# Patient Record
Sex: FEMALE | Race: WHITE | URBAN - METROPOLITAN AREA
[De-identification: names, ages, dates, MRNs, and addresses within clinical notes are randomized per-mention and may not be internally consistent; named-entity substitution may affect disease eponyms.]

---

## 2024-11-23 ENCOUNTER — OFFICE VISIT (OUTPATIENT)
Dept: URGENT CARE | Age: 49
End: 2024-11-23
Payer: COMMERCIAL

## 2024-11-23 VITALS
OXYGEN SATURATION: 98 % | DIASTOLIC BLOOD PRESSURE: 62 MMHG | SYSTOLIC BLOOD PRESSURE: 128 MMHG | RESPIRATION RATE: 16 BRPM | HEART RATE: 73 BPM | TEMPERATURE: 99.2 F

## 2024-11-23 DIAGNOSIS — R05.1 ACUTE COUGH: Primary | ICD-10-CM

## 2024-11-23 RX ORDER — ESCITALOPRAM OXALATE 10 MG/1
10 TABLET ORAL DAILY
COMMUNITY

## 2024-11-23 RX ORDER — LEVOTHYROXINE SODIUM 25 UG/1
TABLET ORAL
COMMUNITY

## 2024-11-23 RX ORDER — BENZONATATE 200 MG/1
200 CAPSULE ORAL 3 TIMES DAILY PRN
Qty: 21 CAPSULE | Refills: 0 | Status: SHIPPED | OUTPATIENT
Start: 2024-11-23 | End: 2024-11-30

## 2024-11-23 RX ORDER — ALBUTEROL SULFATE 90 UG/1
2 INHALANT RESPIRATORY (INHALATION) EVERY 6 HOURS PRN
Qty: 1 G | Refills: 0 | Status: SHIPPED | OUTPATIENT
Start: 2024-11-23

## 2024-11-23 ASSESSMENT — ENCOUNTER SYMPTOMS
FEVER: 0
COUGH: 1
CHILLS: 0
SHORTNESS OF BREATH: 0
SORE THROAT: 0
SINUS PRESSURE: 0
CHEST TIGHTNESS: 0
SINUS PAIN: 0
EYE PAIN: 0
WHEEZING: 0
EYE REDNESS: 0
EYE DISCHARGE: 0

## 2024-11-23 NOTE — PROGRESS NOTES
Subjective   Patient ID: Amina Cuevas is a 48 y.o. female. They present today with a chief complaint of Cough (Cough x 6 weeks with negative covid test.).    History of Present Illness    History provided by:  Patient   used: No    Cough  This is a new problem. The current episode started 1 to 4 weeks ago. The problem has been gradually worsening. The problem occurs every few minutes. The cough is Productive of sputum. Pertinent negatives include no chest pain, chills, ear pain, eye redness, fever, sore throat, shortness of breath or wheezing. She has tried nothing for the symptoms.       Past Medical History  Allergies as of 11/23/2024 - Reviewed 11/23/2024   Allergen Reaction Noted    Sulfa (sulfonamide antibiotics) Rash 09/25/2015       (Not in a hospital admission)       History reviewed. No pertinent past medical history.    History reviewed. No pertinent surgical history.         Review of Systems  Review of Systems   Constitutional:  Negative for chills and fever.   HENT:  Negative for ear pain, sinus pressure, sinus pain and sore throat.         Ear blockage   Eyes:  Negative for pain, discharge and redness.   Respiratory:  Positive for cough. Negative for chest tightness, shortness of breath and wheezing.    Cardiovascular:  Negative for chest pain.                                  Objective    Vitals:    11/23/24 1204   BP: 128/62   Pulse: 73   Resp: 16   Temp: 37.3 °C (99.2 °F)   SpO2: 98%     No LMP recorded.    Physical Exam  Vitals reviewed.   Constitutional:       General: She is not in acute distress.     Appearance: She is not ill-appearing.   HENT:      Right Ear: Tympanic membrane and ear canal normal.      Left Ear: Tympanic membrane and ear canal normal.      Nose: Nose normal.      Mouth/Throat:      Mouth: Mucous membranes are moist.      Pharynx: No posterior oropharyngeal erythema.   Eyes:      Extraocular Movements: Extraocular movements intact.       Conjunctiva/sclera: Conjunctivae normal.      Pupils: Pupils are equal, round, and reactive to light.   Cardiovascular:      Rate and Rhythm: Normal rate and regular rhythm.      Heart sounds: No murmur heard.     No friction rub.   Pulmonary:      Effort: Pulmonary effort is normal. No respiratory distress.      Breath sounds: Wheezing present. No rhonchi or rales.   Lymphadenopathy:      Cervical: No cervical adenopathy.   Neurological:      Mental Status: She is alert.         Procedures    Point of Care Test & Imaging Results from this visit  No results found for this visit on 11/23/24.   No results found.    Diagnostic study results (if any) were reviewed by Xu Dawson DO.    Assessment/Plan   Allergies, medications, history, and pertinent labs/EKGs/Imaging reviewed by Xu Dawson DO.     Orders and Diagnoses  There are no diagnoses linked to this encounter.    Medical Admin Record      Patient disposition: Home    Electronically signed by Xu Dawson DO  12:16 PM